# Patient Record
Sex: MALE | Race: WHITE | Employment: FULL TIME | URBAN - METROPOLITAN AREA
[De-identification: names, ages, dates, MRNs, and addresses within clinical notes are randomized per-mention and may not be internally consistent; named-entity substitution may affect disease eponyms.]

---

## 2017-10-07 ENCOUNTER — OFFICE VISIT (OUTPATIENT)
Dept: FAMILY MEDICINE CLINIC | Facility: CLINIC | Age: 57
End: 2017-10-07

## 2017-10-07 VITALS
RESPIRATION RATE: 16 BRPM | HEART RATE: 67 BPM | BODY MASS INDEX: 24.38 KG/M2 | TEMPERATURE: 98 F | WEIGHT: 180 LBS | OXYGEN SATURATION: 98 % | SYSTOLIC BLOOD PRESSURE: 130 MMHG | HEIGHT: 72 IN | DIASTOLIC BLOOD PRESSURE: 70 MMHG

## 2017-10-07 DIAGNOSIS — R21 RASH AND NONSPECIFIC SKIN ERUPTION: Primary | ICD-10-CM

## 2017-10-07 PROCEDURE — 99203 OFFICE O/P NEW LOW 30 MIN: CPT | Performed by: NURSE PRACTITIONER

## 2017-10-07 RX ORDER — PREDNISONE 20 MG/1
40 TABLET ORAL DAILY
Qty: 10 TABLET | Refills: 0 | Status: SHIPPED | OUTPATIENT
Start: 2017-10-07 | End: 2017-10-12

## 2017-10-07 NOTE — PROGRESS NOTES
CHIEF COMPLAINT:   Patient presents with:  Derm Problem: rash all over body x 1 day          HPI:   Guadelupe Homans is a 56year old male who presents for evaluation of a rash. Per patient rash started in the past 1 days.  Rash has been constant since ons /70 (BP Location: Right arm, Patient Position: Sitting, Cuff Size: adult)   Pulse 67   Temp 98.1 °F (36.7 °C) (Oral)   Resp 16   Ht 72\"   Wt 180 lb   SpO2 98%   BMI 24.41 kg/m²   GENERAL: well developed, well nourished,in no apparent distress  SKIN: 3. Benadryl as directed for itching. 4. Follow up with PMD in 3-4 days for reeval. Follow up sooner or go to the emergency department immediately if symptoms worsen, change, or if you have any concerns.       Self-Care for Skin Rashes     Pat your skin dry · Antihistamines such as diphenhydramine can help control itching. But use with caution because they can make you drowsy.   · Using over-the-counter hydrocortisone cream on small rashes may help reduce swelling and itching  · Most over-the-counter antifunga

## 2017-10-07 NOTE — PATIENT INSTRUCTIONS
1. Rest. Drink plenty of fluids. 2. Prednisone as prescribed. 3. Benadryl as directed for itching.   4. Follow up with PMD in 3-4 days for reeval. Follow up sooner or go to the emergency department immediately if symptoms worsen, change, or if you have an · Many rashes are contagious. Prevent the rash from spreading to others by washing your hands often before or after touching others with any skin rash. Use medicine  · Antihistamines such as diphenhydramine can help control itching.  But use with caution b